# Patient Record
Sex: FEMALE | Race: OTHER | HISPANIC OR LATINO | URBAN - METROPOLITAN AREA
[De-identification: names, ages, dates, MRNs, and addresses within clinical notes are randomized per-mention and may not be internally consistent; named-entity substitution may affect disease eponyms.]

---

## 2022-05-09 ENCOUNTER — EMERGENCY (EMERGENCY)
Facility: HOSPITAL | Age: 22
LOS: 1 days | Discharge: HOME | End: 2022-05-09
Attending: EMERGENCY MEDICINE | Admitting: EMERGENCY MEDICINE
Payer: MEDICAID

## 2022-05-09 VITALS
HEART RATE: 83 BPM | DIASTOLIC BLOOD PRESSURE: 87 MMHG | HEIGHT: 66 IN | OXYGEN SATURATION: 98 % | RESPIRATION RATE: 18 BRPM | SYSTOLIC BLOOD PRESSURE: 121 MMHG | TEMPERATURE: 98 F | WEIGHT: 190.04 LBS

## 2022-05-09 VITALS
TEMPERATURE: 98 F | OXYGEN SATURATION: 99 % | HEART RATE: 68 BPM | DIASTOLIC BLOOD PRESSURE: 77 MMHG | SYSTOLIC BLOOD PRESSURE: 113 MMHG | RESPIRATION RATE: 16 BRPM

## 2022-05-09 LAB — S PYO AG SPEC QL IA: NEGATIVE — SIGNIFICANT CHANGE UP

## 2022-05-09 PROCEDURE — 87081 CULTURE SCREEN ONLY: CPT

## 2022-05-09 PROCEDURE — 99284 EMERGENCY DEPT VISIT MOD MDM: CPT

## 2022-05-09 PROCEDURE — 99283 EMERGENCY DEPT VISIT LOW MDM: CPT

## 2022-05-09 PROCEDURE — 87880 STREP A ASSAY W/OPTIC: CPT

## 2022-05-09 RX ORDER — DIPHENHYDRAMINE HCL 50 MG
50 CAPSULE ORAL ONCE
Refills: 0 | Status: COMPLETED | OUTPATIENT
Start: 2022-05-09 | End: 2022-05-09

## 2022-05-09 RX ADMIN — Medication 50 MILLIGRAM(S): at 10:32

## 2022-05-09 NOTE — ED ADULT NURSE NOTE - OBJECTIVE STATEMENT
22 year old female pt presented to the ED co sore throat, b/l itchy ears x 1 week, pt states taking zyrtec with no relief, pt advised by allergist to come to ED, pt not in any respiratory distress, pt ambulatory a&ox3 lung field cta abd soft non tender non distended Normal vision: sees adequately in most situations; can see medication labels, newsprint

## 2022-05-09 NOTE — ED PROVIDER NOTE - CLINICAL SUMMARY MEDICAL DECISION MAKING FREE TEXT BOX
Rena Ordonez MD  22 yr old with history of allergies, no signs of anaphylaxis, Plan: benadryl and reassess.

## 2022-05-09 NOTE — ED PROVIDER NOTE - OBJECTIVE STATEMENT
Rena Ordonez MD  22 yr old female with history of allergies to trees, has seen allergist since age 11 and receives shots, she has not been to the allergist recently due to insurance changes, she takes zyrtec on a regular basis. She presents with throat tightness, no nasal congestion, no fever, no chills, no rash. LMP at present.  Patient vapes, uses Hookah, and smokes marijuana, She reports no recent use.

## 2022-05-09 NOTE — ED ADULT TRIAGE NOTE - CHIEF COMPLAINT QUOTE
Pt presents to ED with c/o "seasonal allergies" that are causing her throat to swell and itching and pain in ears.  PT is taking Zyrtec.

## 2022-05-09 NOTE — ED PROVIDER NOTE - PROGRESS NOTE DETAILS
Rena Ordonez MD  patient spoke to the PA at the allergist office and asked her to have a strep culture. I doubt patient has strep, no fever, no adenopathy, no other symptoms.  Patient is doing better after benadryl, advised on not driving or drinking alcohol while taking benadryl.

## 2022-05-09 NOTE — ED PROVIDER NOTE - PATIENT PORTAL LINK FT
You can access the FollowMyHealth Patient Portal offered by [facility] by registering at the following website: [email or web address]. By joining [facility]’s FollowMyHealth portal, you will also be able to view your health information using other applications (apps) compatible with our system.

## 2022-05-09 NOTE — ED PROVIDER NOTE - NSFOLLOWUPINSTRUCTIONS_ED_ALL_ED_FT
1. Return to ED for worsening, progressive or any other concerning symptoms   2. Follow up with your allergist in 2-3days  3. Benadryl 25 mg every 5 hours as needed    Allergic Reaction    An allergic reaction is an abnormal reaction to a substance (allergen) by the body's defense system. Common allergens include medicines, food, insect bites or stings, and blood products. The body releases certain proteins into the blood that can cause a variety of symptoms such as an itchy rash, wheezing, swelling of the face/lips/tongue/throat, abdominal pain, nausea or vomiting. An allergic reaction is usually treated with medication. If your health care provider prescribed you an epinephrine injection device, make sure to keep it with you at all times.    SEEK IMMEDIATE MEDICAL CARE IF YOU HAVE ANY OF THE FOLLOWING SYMPTOMS: allergic reaction severe enough that required you to use epinephrine, tightness in your chest, swelling around your lips/tongue/throat, abdominal pain, vomiting or diarrhea, or lightheadedness/dizziness. These symptoms may represent a serious problem that is an emergency. Do not wait to see if the symptoms will go away.  Call 911 and do not drive yourself to the hospital.

## 2024-03-06 NOTE — ED PROVIDER NOTE - INTERNATIONAL TRAVEL
FOBT kit mailed to patient to collect samples ans send it back through the enclosed [place] postal service mailing envelope.  
No